# Patient Record
Sex: MALE | Race: OTHER | URBAN - METROPOLITAN AREA
[De-identification: names, ages, dates, MRNs, and addresses within clinical notes are randomized per-mention and may not be internally consistent; named-entity substitution may affect disease eponyms.]

---

## 2018-09-03 ENCOUNTER — EMERGENCY (EMERGENCY)
Facility: HOSPITAL | Age: 28
LOS: 1 days | Discharge: HOME | End: 2018-09-03
Attending: EMERGENCY MEDICINE
Payer: COMMERCIAL

## 2018-09-03 VITALS
OXYGEN SATURATION: 96 % | DIASTOLIC BLOOD PRESSURE: 93 MMHG | HEART RATE: 113 BPM | HEIGHT: 68 IN | SYSTOLIC BLOOD PRESSURE: 141 MMHG | TEMPERATURE: 99 F | RESPIRATION RATE: 20 BRPM | WEIGHT: 175.05 LBS

## 2018-09-03 VITALS
HEART RATE: 75 BPM | OXYGEN SATURATION: 99 % | TEMPERATURE: 98 F | DIASTOLIC BLOOD PRESSURE: 84 MMHG | SYSTOLIC BLOOD PRESSURE: 145 MMHG | RESPIRATION RATE: 16 BRPM

## 2018-09-03 PROCEDURE — 76376 3D RENDER W/INTRP POSTPROCES: CPT | Mod: 26

## 2018-09-03 PROCEDURE — 70486 CT MAXILLOFACIAL W/O DYE: CPT

## 2018-09-03 PROCEDURE — 70486 CT MAXILLOFACIAL W/O DYE: CPT | Mod: 26

## 2018-09-03 PROCEDURE — 99284 EMERGENCY DEPT VISIT MOD MDM: CPT | Mod: 25

## 2018-09-03 PROCEDURE — 99284 EMERGENCY DEPT VISIT MOD MDM: CPT

## 2018-09-03 PROCEDURE — 76376 3D RENDER W/INTRP POSTPROCES: CPT

## 2018-09-03 RX ORDER — IBUPROFEN 200 MG
600 TABLET ORAL ONCE
Qty: 0 | Refills: 0 | Status: COMPLETED | OUTPATIENT
Start: 2018-09-03 | End: 2018-09-03

## 2018-09-03 RX ADMIN — Medication 600 MILLIGRAM(S): at 19:13

## 2018-09-03 RX ADMIN — Medication 600 MILLIGRAM(S): at 23:24

## 2018-09-03 RX ADMIN — Medication 300 MILLIGRAM(S): at 23:24
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
## 2018-09-03 NOTE — ED ADULT NURSE NOTE - NSIMPLEMENTINTERV_GEN_ALL_ED
Implemented All Universal Safety Interventions:  [place] to call system. Call bell, personal items and telephone within reach. Instruct patient to call for assistance. Room bathroom lighting operational. Non-slip footwear when patient is off stretcher. Physically safe environment: no spills, clutter or unnecessary equipment. Stretcher in lowest position, wheels locked, appropriate side rails in place.
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
## 2018-09-03 NOTE — ED PROVIDER NOTE - NS ED ROS FT
Constitution: No LOC No Fever or chills  Eyes: No visual changes  HEENT: No Rhinorrhea, no fluid from ears. No URI symptoms  Cardio: No Chest pain  Resp: No SOB  GI: No abdominal pain  : No dysuria  MSK: + jaw pain, No neck pain.   Neuro: No Headache  Skin: No rashes  Fabrice Adams, PGY-1

## 2018-09-03 NOTE — ED PROVIDER NOTE - CARE PLAN
Principal Discharge DX:	Closed fracture of right side of mandible, unspecified mandibular site, initial encounter
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
## 2018-09-04 ENCOUNTER — EMERGENCY (EMERGENCY)
Facility: HOSPITAL | Age: 28
LOS: 1 days | Discharge: HOME | End: 2018-09-04
Attending: EMERGENCY MEDICINE
Payer: COMMERCIAL

## 2018-09-04 VITALS
HEART RATE: 50 BPM | RESPIRATION RATE: 16 BRPM | SYSTOLIC BLOOD PRESSURE: 178 MMHG | DIASTOLIC BLOOD PRESSURE: 108 MMHG | OXYGEN SATURATION: 99 %

## 2018-09-04 VITALS
SYSTOLIC BLOOD PRESSURE: 141 MMHG | TEMPERATURE: 98 F | HEART RATE: 52 BPM | RESPIRATION RATE: 17 BRPM | OXYGEN SATURATION: 98 % | DIASTOLIC BLOOD PRESSURE: 95 MMHG

## 2018-09-04 PROCEDURE — 90471 IMMUNIZATION ADMIN: CPT

## 2018-09-04 PROCEDURE — 99284 EMERGENCY DEPT VISIT MOD MDM: CPT | Mod: 25

## 2018-09-04 PROCEDURE — 99284 EMERGENCY DEPT VISIT MOD MDM: CPT

## 2018-09-04 PROCEDURE — 90715 TDAP VACCINE 7 YRS/> IM: CPT

## 2018-09-04 RX ORDER — TETANUS TOXOID, REDUCED DIPHTHERIA TOXOID AND ACELLULAR PERTUSSIS VACCINE, ADSORBED 5; 2.5; 8; 8; 2.5 [IU]/.5ML; [IU]/.5ML; UG/.5ML; UG/.5ML; UG/.5ML
0.5 SUSPENSION INTRAMUSCULAR ONCE
Qty: 0 | Refills: 0 | Status: COMPLETED | OUTPATIENT
Start: 2018-09-04 | End: 2018-09-04

## 2018-09-04 RX ORDER — IBUPROFEN 200 MG
400 TABLET ORAL ONCE
Qty: 0 | Refills: 0 | Status: COMPLETED | OUTPATIENT
Start: 2018-09-04 | End: 2018-09-04

## 2018-09-04 RX ORDER — OXYCODONE HYDROCHLORIDE 5 MG/1
5 TABLET ORAL ONCE
Qty: 0 | Refills: 0 | Status: DISCONTINUED | OUTPATIENT
Start: 2018-09-04 | End: 2018-09-04

## 2018-09-04 RX ORDER — OXYCODONE HYDROCHLORIDE 5 MG/1
5 TABLET ORAL
Qty: 120 | Refills: 0 | OUTPATIENT
Start: 2018-09-04 | End: 2018-09-08

## 2018-09-04 RX ORDER — ACETAMINOPHEN 500 MG
25 TABLET ORAL
Qty: 600 | Refills: 0 | OUTPATIENT
Start: 2018-09-04 | End: 2018-09-08

## 2018-09-04 RX ORDER — PANTOPRAZOLE SODIUM 20 MG/1
40 TABLET, DELAYED RELEASE ORAL ONCE
Qty: 0 | Refills: 0 | Status: COMPLETED | OUTPATIENT
Start: 2018-09-04 | End: 2018-09-04

## 2018-09-04 RX ORDER — IBUPROFEN 200 MG
20 TABLET ORAL
Qty: 500 | Refills: 0 | OUTPATIENT
Start: 2018-09-04 | End: 2018-09-08

## 2018-09-04 RX ORDER — DIAZEPAM 5 MG
5 TABLET ORAL ONCE
Qty: 0 | Refills: 0 | Status: DISCONTINUED | OUTPATIENT
Start: 2018-09-04 | End: 2018-09-04

## 2018-09-04 RX ADMIN — Medication 5 MILLIGRAM(S): at 08:40

## 2018-09-04 RX ADMIN — Medication 400 MILLIGRAM(S): at 08:39

## 2018-09-04 RX ADMIN — OXYCODONE HYDROCHLORIDE 5 MILLIGRAM(S): 5 TABLET ORAL at 11:04

## 2018-09-04 RX ADMIN — TETANUS TOXOID, REDUCED DIPHTHERIA TOXOID AND ACELLULAR PERTUSSIS VACCINE, ADSORBED 0.5 MILLILITER(S): 5; 2.5; 8; 8; 2.5 SUSPENSION INTRAMUSCULAR at 10:16

## 2018-09-04 RX ADMIN — PANTOPRAZOLE SODIUM 40 MILLIGRAM(S): 20 TABLET, DELAYED RELEASE ORAL at 09:03

## 2018-09-04 NOTE — ED PROVIDER NOTE - CHIEF COMPLAINT
The patient is a 28y Male complaining of The patient is a 28y Male complaining of Mandibular/Maxillary Fractures
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
## 2018-09-04 NOTE — ED PROVIDER NOTE - ENMT, MLM
Airway patent, Nasal mucosa clear. Mouth with normal mucosa. Throat has no vesicles, no oropharyngeal exudates and uvula is midline. No septal hematomas of the nose.

## 2018-09-04 NOTE — ED PROVIDER NOTE - ATTENDING CONTRIBUTION TO CARE
I performed a history and physical exam of the patient and discussed their management with the resident and /or advanced care provider. I reviewed the resident and /or ACP's note and agree with the documented findings and plan of care. My medical decision making and observations are found above.  Lungs clear, neck non tender. nl vision.

## 2018-09-04 NOTE — PROCEDURE NOTE - PROCEDURE
<<-----Click on this checkbox to enter Procedure Application of interdental fixation device  09/04/2018    Active  GABRIELA

## 2018-09-04 NOTE — PROCEDURE NOTE - GENERAL PROCEDURE DETAILS
Infiltration of maxillary gingivobuccal sulcus and block of ABEL bilaterally.  Placement of hybrid bars and wires with good occlusion
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
## 2018-09-04 NOTE — ED PROVIDER NOTE - CARE PLAN
Principal Discharge DX:	Mandible fracture  Assessment and plan of treatment:	Instructions: You may consume anything which may be consumed through a straw (soups, milkshakes, protein shakes etc).  Avoid nose blowing in light of maxillary sinus fracture.  Take liquid formulations of Tylenol and Ibuprofen every 4-6 hours as needed for pain control during the first few days. Please follow all instructions accompanying the medication. If you have pain despite these medications, please use, oxycodone solution. Please take a 5 day course of the antibiotic, Augmentin, following all instructions accompanying the medication. You will need to remain in interdental fixation for 4 weeks and should call Dr Badillo's office to schedule follow up. Her office is located at [address] [place] [address], [address], [place] [address]. You may call [phone] to schedule an appointment for the next 1-2 weeks.  If  you have any questions regarding the fixation, you can also call that office.  Secondary Diagnosis:	Maxillary fracture
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
## 2018-09-04 NOTE — ED ADULT NURSE REASSESSMENT NOTE - NS ED NURSE REASSESS COMMENT FT1
Pts BP elevated MD Pillai aware and pt okayed for discharge. Pt reports pain 10/10. Pt given discharged instructions and verbalizes understanding
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
## 2018-09-04 NOTE — ED PROVIDER NOTE - EYES, MLM
Clear bilaterally, pupils equal, round and reactive to light. EOMI. Full extraocular movements w/o pain.

## 2018-09-04 NOTE — ED PROVIDER NOTE - PLAN OF CARE
Instructions: You may consume anything which may be consumed through a straw (soups, milkshakes, protein shakes etc).  Avoid nose blowing in light of maxillary sinus fracture.  Take liquid formulations of Tylenol and Ibuprofen every 4-6 hours as needed for pain control during the first few days. Please follow all instructions accompanying the medication. If you have pain despite these medications, please use, oxycodone solution. Please take a 5 day course of the antibiotic, Augmentin, following all instructions accompanying the medication. You will need to remain in interdental fixation for 4 weeks and should call Dr Badillo's office to schedule follow up. Her office is located at [address] [place] [address], [place] [id number] [address]. You may call [phone] to schedule an appointment for the next 1-2 weeks.  If  you have any questions regarding the fixation, you can also call that office.

## 2023-12-21 ENCOUNTER — APPOINTMENT (OUTPATIENT)
Dept: ORTHOPEDIC SURGERY | Facility: CLINIC | Age: 33
End: 2023-12-21

## 2023-12-23 PROBLEM — Z00.00 ENCOUNTER FOR PREVENTIVE HEALTH EXAMINATION: Status: ACTIVE | Noted: 2023-12-23

## 2024-01-02 ENCOUNTER — APPOINTMENT (OUTPATIENT)
Dept: ORTHOPEDIC SURGERY | Facility: CLINIC | Age: 34
End: 2024-01-02

## 2024-01-30 ENCOUNTER — APPOINTMENT (OUTPATIENT)
Dept: ORTHOPEDIC SURGERY | Facility: CLINIC | Age: 34
End: 2024-01-30
Payer: COMMERCIAL

## 2024-01-30 VITALS — BODY MASS INDEX: 27.58 KG/M2 | WEIGHT: 182 LBS | HEIGHT: 68 IN

## 2024-01-30 DIAGNOSIS — M25.511 PAIN IN RIGHT SHOULDER: ICD-10-CM

## 2024-01-30 DIAGNOSIS — S62.92XA UNSPECIFIED FRACTURE OF LEFT WRIST AND HAND, INITIAL ENCOUNTER FOR CLOSED FRACTURE: ICD-10-CM

## 2024-01-30 PROCEDURE — 73130 X-RAY EXAM OF HAND: CPT | Mod: LT

## 2024-01-30 PROCEDURE — 99203 OFFICE O/P NEW LOW 30 MIN: CPT

## 2024-01-30 PROCEDURE — 73030 X-RAY EXAM OF SHOULDER: CPT | Mod: RT

## 2024-01-30 NOTE — END OF VISIT
[FreeTextEntry3] :  All medical record entries made by the Scribe were at my,  Dr. Martinez Joshi MD., direction and personally dictated by me on 01/30/2024. I have personally reviewed the chart and agree that the record accurately reflects my personal performance of the history, physical exam, assessment and plan.

## 2024-01-30 NOTE — DISCUSSION/SUMMARY
[de-identified] :  The underlying pathophysiology was reviewed with the patient. XR films were reviewed with the patient. Discussed at length the nature of the patient's condition.   Patient was advised to take OTC medications and topical analgesic for pain management.  Patient was advised to move the hand to avoid stifness.  The patient wishes to proceed with physical therapy for left pinky and right shoulder ROM and strengthening.  A script was given.  All questions answered, understanding verbalized. Patient in agreement with plan of care.   Patient was advised to follow up as needed.

## 2024-01-30 NOTE — ADDENDUM
[FreeTextEntry1] :  I, Oli Macias wrote this note acting as a scribe for Dr. Martinez Joshi on Jan 30, 2024.

## 2024-01-30 NOTE — PHYSICAL EXAM
[de-identified] :  Patient is WDWN, alert, and in no acute distress. Breathing is unlabored. He is grossly oriented to person, place, and time.  Left Hand:  tenderness is present  sensation is intact  edema is present [de-identified] : AP, transcapula, and axillary views of the Right shoulder were obtained and revealed no abnormalities. No acute fracture. No dislocation. Cartilage spaces are maintained.   AP, lateral and oblique views of the Left Hand were obtained today and revealed little finger proximal phalanx fracture.

## 2024-01-30 NOTE — HISTORY OF PRESENT ILLNESS
[de-identified] : 33 year old RHD male  presents for evaluation of a left hand fracture sustained 12/21/23 when he fell on an outstretched hand. He was seen at [facility] where he was diagnosed with a fracture and was splinted. He states that he removed the splint because he recently started a new job and needed to work. He complains of pain in the left small finger. He is unable to bend the finger to make a tight fist.  He also complains of right shoulder pain and weakness. No injury reported.

## 2025-01-27 NOTE — ED PROVIDER NOTE - NS_EDPROVIDERDISPOUSERTYPE_ED_A_ED
Medication:   Requested Prescriptions     Pending Prescriptions Disp Refills    omeprazole (PRILOSEC) 40 MG delayed release capsule [Pharmacy Med Name: OMEPRAZOLE DR 40 MG CAPSULE] 90 capsule 2     Sig: TAKE 1 CAPSULE BY MOUTH DAILY        Last Filled:  4/15/2024    Patient Phone Number: [phone] (home)     Last appt: 2/26/2024   Next appt: Visit date not found    Last OARRS:        No data to display                  
Attending Attestation (For Attendings USE Only)...